# Patient Record
Sex: MALE | Race: WHITE
[De-identification: names, ages, dates, MRNs, and addresses within clinical notes are randomized per-mention and may not be internally consistent; named-entity substitution may affect disease eponyms.]

---

## 2019-11-19 ENCOUNTER — HOSPITAL ENCOUNTER (EMERGENCY)
Dept: HOSPITAL 76 - ED | Age: 29
Discharge: HOME | End: 2019-11-19
Payer: COMMERCIAL

## 2019-11-19 VITALS — DIASTOLIC BLOOD PRESSURE: 70 MMHG | SYSTOLIC BLOOD PRESSURE: 130 MMHG

## 2019-11-19 DIAGNOSIS — S61.011A: Primary | ICD-10-CM
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
## 2019-11-19 PROCEDURE — 12001 RPR S/N/AX/GEN/TRNK 2.5CM/<: CPT

## 2019-11-19 PROCEDURE — 99282 EMERGENCY DEPT VISIT SF MDM: CPT

## 2019-11-19 PROCEDURE — 1040M: CPT

## 2019-11-19 NOTE — ED PHYSICIAN DOCUMENTATION
PD HPI UPPER EXT INJURY





- Stated complaint


Stated Complaint: R THUMB LAC





- Chief complaint


Chief Complaint: Laceration





- History obtained from


History obtained from: Patient





- History of Present Illness


Location: Right, Finger (tip of thumb lacerated with table saw.)


Type of injury: Laceration


Where injury occurred: Work


Timing - onset: Today


Timing - details: Abrupt onset, Still present (bleeding if not having direct 

pressure.)


Worsened by: Moving, Palpating


Associated symptoms: No: Weakness, Numbness





Review of Systems


Neurologic: reports: Near syncope (he says he did get lightheaded after the 

injury, as he does not like the sight of blood. He does not feel there was a 

significant amount of blood loss.).  denies: Focal weakness





PD PAST MEDICAL HISTORY





- Past Medical History


Past Medical History: No





- Allergies


Allergies/Adverse Reactions: 


                                    Allergies











Allergy/AdvReac Type Severity Reaction Status Date / Time


 


No Known Drug Allergies Allergy   Verified 11/19/19 14:21














PD ED PE NORMAL





- Vitals


Vital signs reviewed: Yes





- General


General: Alert and oriented X 3, No acute distress, Well developed/nourished





- Derm


Derm: Normal color, Warm and dry





- Extremities


Extremities: Other (right thumb tip with horizontal laceration with irregular 

edges just volar to the end of the nailbed. No FB noted. Mild bleeding when 

dressing removed. It does not appear deep enough to be down to bone. No injury 

at the IP joint. Has good full flex and ext of the thumb. )





- Neuro


Neuro: Alert and oriented X 3, No motor deficit, No sensory deficit





Results





- Vitals


Vitals: 


                               Vital Signs - 24 hr











  11/19/19 11/19/19





  14:17 17:35


 


Temperature 36.9 C 


 


Heart Rate 60 60


 


Respiratory 18 16





Rate  


 


Blood Pressure 131/70 H 130/70


 


O2 Saturation 99 100








                                     Oxygen











O2 Source                      Room air

















Procedures





- Laceration (location)


  ** right thumb


Length in cm: 2.4


Wound type: Linear, Into subcut fat, Clean


Neurovascular status: Sensory intact, Motor intact


Anesthesia: Lidocaine 2% (digital block)


Wound Preparation: Irrigated copiously NS, Wound explored, To the base, Wound 

edges modified.  No: FB identified


Skin layer closure: Nylon, Running, Size #-0 - enter number (4), Sutures - enter

# (11)


Other: Patient tolerated well, No complications, Dressing applied, Tetanus UTD


Complexity: Simple





PD MEDICAL DECISION MAKING





- ED course


Complexity details: considered differential, d/w patient





Departure





- Departure


Disposition: 01 Home, Self Care


Clinical Impression: 


Thumb laceration


Qualifiers:


 Encounter type: initial encounter Damage to nail status: without damage Foreign

body presence: without foreign body Laterality: right Qualified Code(s): 

S61.011A - Laceration without foreign body of right thumb without damage to 

nail, initial encounter





Condition: Stable


Record reviewed to determine appropriate education?: Yes


Instructions:  ED Laceration Hand


Comments: 


It is okay to wash and shower. Clean off the wound twice a day with soap and 

water, or peroxide and water. Apply some antibiotic ointment to it to keep it 

moist. Also to watch for signs of infection such as purulence, redness or 

increasing pain. Return to your primary care or the ER at the specified time for

suture removal.





Suture removal approximately 10 days.





Ibuprofen or naproxen 2-3 times a day and add Tylenol every 6 hours as needed 

for pains.





Limited use of the right hand and thumb for 10 days until suture removal.


Forms:  Activity restrictions


Discharge Date/Time: 11/19/19 17:35